# Patient Record
Sex: MALE | Race: BLACK OR AFRICAN AMERICAN | NOT HISPANIC OR LATINO | Employment: FULL TIME | URBAN - METROPOLITAN AREA
[De-identification: names, ages, dates, MRNs, and addresses within clinical notes are randomized per-mention and may not be internally consistent; named-entity substitution may affect disease eponyms.]

---

## 2023-12-18 ENCOUNTER — APPOINTMENT (OUTPATIENT)
Dept: RADIOLOGY | Facility: HOSPITAL | Age: 48
End: 2023-12-18
Payer: MEDICARE

## 2023-12-18 ENCOUNTER — HOSPITAL ENCOUNTER (EMERGENCY)
Facility: HOSPITAL | Age: 48
Discharge: HOME | End: 2023-12-18
Payer: MEDICARE

## 2023-12-18 VITALS
HEART RATE: 76 BPM | DIASTOLIC BLOOD PRESSURE: 89 MMHG | RESPIRATION RATE: 16 BRPM | HEIGHT: 68 IN | SYSTOLIC BLOOD PRESSURE: 134 MMHG | OXYGEN SATURATION: 98 % | WEIGHT: 175 LBS | BODY MASS INDEX: 26.52 KG/M2 | TEMPERATURE: 98.4 F
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
## 2023-12-18 DIAGNOSIS — S39.012A LOW BACK STRAIN, INITIAL ENCOUNTER: ICD-10-CM

## 2023-12-18 DIAGNOSIS — S01.01XA SCALP LACERATION, INITIAL ENCOUNTER: ICD-10-CM

## 2023-12-18 PROCEDURE — 2500000005 HC RX 250 GENERAL PHARMACY W/O HCPCS

## 2023-12-18 PROCEDURE — 96372 THER/PROPH/DIAG INJ SC/IM: CPT

## 2023-12-18 PROCEDURE — 70450 CT HEAD/BRAIN W/O DYE: CPT

## 2023-12-18 PROCEDURE — 99284 EMERGENCY DEPT VISIT MOD MDM: CPT

## 2023-12-18 PROCEDURE — 2500000001 HC RX 250 WO HCPCS SELF ADMINISTERED DRUGS (ALT 637 FOR MEDICARE OP)

## 2023-12-18 PROCEDURE — 99284 EMERGENCY DEPT VISIT MOD MDM: CPT | Mod: 25

## 2023-12-18 PROCEDURE — 70450 CT HEAD/BRAIN W/O DYE: CPT | Performed by: RADIOLOGY

## 2023-12-18 PROCEDURE — 2500000004 HC RX 250 GENERAL PHARMACY W/ HCPCS (ALT 636 FOR OP/ED)

## 2023-12-18 RX ORDER — ORPHENADRINE CITRATE 100 MG/1
100 TABLET, EXTENDED RELEASE ORAL 2 TIMES DAILY PRN
Qty: 6 TABLET | Refills: 0 | Status: SHIPPED | OUTPATIENT
Start: 2023-12-18 | End: 2023-12-21

## 2023-12-18 RX ORDER — ACETAMINOPHEN 325 MG/1
650 TABLET ORAL ONCE
Status: COMPLETED | OUTPATIENT
Start: 2023-12-18 | End: 2023-12-18

## 2023-12-18 RX ORDER — ORPHENADRINE CITRATE 30 MG/ML
60 INJECTION INTRAMUSCULAR; INTRAVENOUS ONCE
Status: COMPLETED | OUTPATIENT
Start: 2023-12-18 | End: 2023-12-18

## 2023-12-18 RX ORDER — LIDOCAINE 50 MG/G
1 PATCH TOPICAL DAILY
Qty: 7 PATCH | Refills: 0 | Status: SHIPPED | OUTPATIENT
Start: 2023-12-18 | End: 2023-12-25

## 2023-12-18 RX ORDER — KETOROLAC TROMETHAMINE 30 MG/ML
30 INJECTION, SOLUTION INTRAMUSCULAR; INTRAVENOUS ONCE
Status: COMPLETED | OUTPATIENT
Start: 2023-12-18 | End: 2023-12-18

## 2023-12-18 RX ORDER — BACITRACIN ZINC 500 UNIT/G
OINTMENT IN PACKET (EA) TOPICAL ONCE
Status: COMPLETED | OUTPATIENT
Start: 2023-12-18 | End: 2023-12-18

## 2023-12-18 RX ORDER — ACETAMINOPHEN 325 MG/1
650 TABLET ORAL EVERY 6 HOURS PRN
Qty: 60 TABLET | Refills: 0 | Status: SHIPPED | OUTPATIENT
Start: 2023-12-18 | End: 2024-01-01

## 2023-12-18 RX ORDER — NAPROXEN 500 MG/1
500 TABLET ORAL 2 TIMES DAILY PRN
Qty: 60 TABLET | Refills: 0 | Status: SHIPPED | OUTPATIENT
Start: 2023-12-18

## 2023-12-18 RX ORDER — LIDOCAINE 560 MG/1
1 PATCH PERCUTANEOUS; TOPICAL; TRANSDERMAL DAILY
Status: DISCONTINUED | OUTPATIENT
Start: 2023-12-18 | End: 2023-12-18 | Stop reason: HOSPADM

## 2023-12-18 RX ADMIN — ORPHENADRINE CITRATE 60 MG: 60 INJECTION INTRAMUSCULAR; INTRAVENOUS at 13:20

## 2023-12-18 RX ADMIN — BACITRACIN 1 APPLICATION: 500 OINTMENT TOPICAL at 14:36

## 2023-12-18 RX ADMIN — ACETAMINOPHEN 650 MG: 325 TABLET ORAL at 13:20

## 2023-12-18 RX ADMIN — LIDOCAINE 1 PATCH: 4 PATCH TOPICAL at 13:20

## 2023-12-18 RX ADMIN — KETOROLAC TROMETHAMINE 30 MG: 30 INJECTION, SOLUTION INTRAMUSCULAR; INTRAVENOUS at 15:17

## 2023-12-18 ASSESSMENT — PAIN - FUNCTIONAL ASSESSMENT: PAIN_FUNCTIONAL_ASSESSMENT: 0-10

## 2023-12-18 ASSESSMENT — LIFESTYLE VARIABLES
HAVE YOU EVER FELT YOU SHOULD CUT DOWN ON YOUR DRINKING: NO
HAVE PEOPLE ANNOYED YOU BY CRITICIZING YOUR DRINKING: NO
EVER FELT BAD OR GUILTY ABOUT YOUR DRINKING: NO
REASON UNABLE TO ASSESS: NO
EVER HAD A DRINK FIRST THING IN THE MORNING TO STEADY YOUR NERVES TO GET RID OF A HANGOVER: NO

## 2023-12-18 ASSESSMENT — COLUMBIA-SUICIDE SEVERITY RATING SCALE - C-SSRS
1. IN THE PAST MONTH, HAVE YOU WISHED YOU WERE DEAD OR WISHED YOU COULD GO TO SLEEP AND NOT WAKE UP?: NO
2. HAVE YOU ACTUALLY HAD ANY THOUGHTS OF KILLING YOURSELF?: NO
6. HAVE YOU EVER DONE ANYTHING, STARTED TO DO ANYTHING, OR PREPARED TO DO ANYTHING TO END YOUR LIFE?: NO

## 2023-12-18 ASSESSMENT — PAIN DESCRIPTION - PAIN TYPE: TYPE: ACUTE PAIN

## 2023-12-18 ASSESSMENT — PAIN DESCRIPTION - LOCATION: LOCATION: HEAD

## 2023-12-18 ASSESSMENT — PAIN SCALES - GENERAL: PAINLEVEL_OUTOF10: 6

## 2023-12-18 NOTE — Clinical Note
Erica Esposito was seen and treated in our emergency department on 12/18/2023.  He may return to work on 12/22/2023.       If you have any questions or concerns, please don't hesitate to call.      Esthela Vicente PA-C

## 2023-12-18 NOTE — ED PROVIDER NOTES
"  HPI    CC: Fall, Head trauma  HPI:   Erica Esposito is a 48 year old AA male presenting to ED with head injury and complaints of headache, dizziness and visual disturbances s/p fall. 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 He denies any LOC or amnesia to event and reports no pain, or nausea or emesis immediately afterwards. However, since the fall patient has been feeling more dizzy, worse on standing and endorses an acute progressively worsening generalized headache. He describes headache as 10/10 in severity and throbbing, \"pressure-like\" in nature. No associated vision loss, photophobia, phonophobia. Denies thunderclap component. He is not on any blood thinners.   Patient also complains of diffuse body aches and pain throughout the musculature in his entire back and neck. No midline vertebral tenderness. No extremity weakness/numbness, urinary or bowel incontinence, saddle anesthesia. No prior history of back injury or surgeries. He hasn't taken anything yet for the pain, states came straight to ED for evaluation.     PMHx:  PSH: reviewed per EMR  Allergies: Reviewed per EMR  Medications: Reviewed per EMR  FH: Noncontributory  Social Hx: Denies tobacco. Denies EtOH, illicit substance use.      ROS: All other systems were reviewed and negative.    Physical Exam:   GENERAL: Patient is well-appearing, cooperative. Vitals noted, NAD. Afebrile  HEAD: Normocephalic.   NECK: Supple, full ROM. No lymphadenopathy.  EYES: PERRL, EOMI, anicteric sclera. No erythema or exudates. Negative ramírez signs, no raccoon eyes. Visual acuity is 20/20 OS/OD/OU.  ENMT: Hearing grossly intact. TM's intact bilaterally, small amount of blood within left EAC's. No hemotympanum bilaterally. MMM, oronasopharynx is patent. Uvula midline. Normal phonation.  CV: Regular rate & rhythm. S1/S2 present. No murmur, gallops or rubs.  PULM: CTAB with " normal effort. No wheezes, rales or rhonchi.  ABDOMEN: Soft, nontender, nonsurgical. No peritoneal signs. BS+ x 4. No HSM or pulsatile masses.  MSK/Back: Full wt. bearing, Spontaneously WAYNE x 4. No midline vertebral tenderness over the C-spine, T-spine or L-spine. (+) minimal tenderness throughout paraspinal musculature. Sciatic notch is non-tender. SLR is negative bilaterally. Symmetric muscle bulk without gross step-off or deformities. Muscle strength is 5/5 in all four extremities. No joint pain or effusions.   EXTREMITIES: WWP, 2+ bilateral RPs and DPPs. No pitting LE edema.  SKIN: (+) Small superficial laceration to right parietal head with scant dried blood. No rash, lesions or discoloration. Cap refill <2s.  NEURO: AA&Ox3, Speech fluent. No focal deficits identified. Normal gait. Answering questions appropriately.    -------------------------------------------------------------------------------------------  ED Course & MDM   Triage notes and vital signs were reviewed. History and physical exam were performed and the patient was staffed with the attending. I also reviewed recent and relevant outside records for thorough HPI.    Medical Decision Making  Mr. Esposito is a 48 year old male presenting after fall with complaints of headache, dizziness and diplopia as well as diffuse musculoskeletal pain. No LOC or blood thinners. Vital signs are WNL. Patient is overall well-appearing, awake, alert and oriented x 3. GCS is 15 on arrival and mentation is intact to conversation. He ambulates without assistive devices, gait is non-ataxic. On exam, he has a small laceration to right parietal scalp and mild diffuse tenderness throughout musculature of thoracic and lumbar region. No midline vertebral tenderness or red flag back pain signs or symptoms.   C-spine is cleared based on [employer] CT criteria. Based on clinical presentation, non-contrast CT head ordered to evaluate for acute intracranial bleed, skull fractures  given new onset headache s/p head strike injury. He was given norflex, tylenol and lidocaine patch for pain control.    On reevaluation, patient noted near complete resolution of headache after treatment with analgesics here in ED.  He is no longer feeling dizzy and states diplopia has completely resolved.  I informed patient that CT head was negative for any acute intracranial pathology or skull fractures. Scalp laceration was cleaned and irrigated thoroughly with sterile water. Patient UTD on tetanus vaccine. On examination, laceration is less than 0.5cm, well approximated and extremely superficial without any foreign bodies identified. Therefore no indication for staple repair at this time. Topical bacitracin ointment applied and wound care instructions were provided. Patient is feeling much better and ready to go home. He was medicated with IM toradol prior to being discharged home with prescriptions for lidocaine patch, ibuprofen, tylenol and flexeril. He was also provided with note for work and advised about ED return precautions.     Amount and/or Complexity of Data Reviewed  Radiology: ordered. Decision-making details documented in ED Course.    Risk  OTC drugs.  Prescription drug management.        ED Course as of 12/18/23 1506   Mon Dec 18, 2023   1320 Patient medicated with norflex, lidocaine and tylenol for symptomatic support.  [MV]   1423 Non-contrast CTH was negative for intracranial hemorrhage or displaced skull fracture. [MV]      ED Course User Index  [MV] Esthela Vicente PA-C         Diagnoses as of 12/18/23 1506   Fall, initial encounter   Scalp laceration, initial encounter   Low back strain, initial encounter        Clinical Impression: Fall and head injury    Disposition: Home-going  Counseled patient regarding lab results, radiology results and suspected diagnosis. Advised to follow-up with PCP on out-patient basis. The patient has demonstrated clinical improvement and was discharged home in  stable condition with prescriptions for lidoderm, tylenol, ibuprofen and norflex. ED return precautions were discussed and provided at time of discharge. Patient acknowledged their understanding and is amendable to the treatment plan. All discharge instructions explained to patient and all questions were answered.    Esthela Vicente PA-C  [facility]    Disclaimer: This note was dictated by speech recognition. Minor errors in transcription may be present. Please call if questions.  -------------------------------------------------------------------------------------------       Esthela Vicente PA-C  12/18/23 1506

## 2023-12-18 NOTE — ED TRIAGE NOTES
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

## 2024-07-01 ENCOUNTER — HOSPITAL ENCOUNTER (EMERGENCY)
Facility: HOSPITAL | Age: 49
Discharge: HOME | End: 2024-07-01
Payer: MEDICARE

## 2024-07-01 ENCOUNTER — CLINICAL SUPPORT (OUTPATIENT)
Dept: EMERGENCY MEDICINE | Facility: HOSPITAL | Age: 49
End: 2024-07-01
Payer: MEDICARE

## 2024-07-01 ENCOUNTER — APPOINTMENT (OUTPATIENT)
Dept: RADIOLOGY | Facility: HOSPITAL | Age: 49
End: 2024-07-01
Payer: MEDICARE

## 2024-07-01 VITALS
OXYGEN SATURATION: 98 % | WEIGHT: 160 LBS | HEART RATE: 79 BPM | SYSTOLIC BLOOD PRESSURE: 156 MMHG | BODY MASS INDEX: 25.11 KG/M2 | RESPIRATION RATE: 16 BRPM | TEMPERATURE: 98.2 F | DIASTOLIC BLOOD PRESSURE: 101 MMHG | HEIGHT: 67 IN

## 2024-07-01 DIAGNOSIS — R07.81 RIB PAIN: ICD-10-CM
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
## 2024-07-01 LAB
ATRIAL RATE: 66 BPM
P AXIS: 39 DEGREES
P OFFSET: 189 MS
P ONSET: 139 MS
PR INTERVAL: 144 MS
Q ONSET: 211 MS
QRS COUNT: 11 BEATS
QRS DURATION: 88 MS
QT INTERVAL: 372 MS
QTC CALCULATION(BAZETT): 389 MS
QTC FREDERICIA: 384 MS
R AXIS: 43 DEGREES
T AXIS: 50 DEGREES
T OFFSET: 397 MS
VENTRICULAR RATE: 66 BPM

## 2024-07-01 PROCEDURE — 2500000005 HC RX 250 GENERAL PHARMACY W/O HCPCS: Performed by: PHYSICIAN ASSISTANT

## 2024-07-01 PROCEDURE — 93010 ELECTROCARDIOGRAM REPORT: CPT

## 2024-07-01 PROCEDURE — 2500000002 HC RX 250 W HCPCS SELF ADMINISTERED DRUGS (ALT 637 FOR MEDICARE OP, ALT 636 FOR OP/ED): Performed by: PHYSICIAN ASSISTANT

## 2024-07-01 PROCEDURE — 71101 X-RAY EXAM UNILAT RIBS/CHEST: CPT | Mod: LT

## 2024-07-01 PROCEDURE — 71101 X-RAY EXAM UNILAT RIBS/CHEST: CPT | Mod: LEFT SIDE | Performed by: SURGERY

## 2024-07-01 PROCEDURE — 93005 ELECTROCARDIOGRAM TRACING: CPT

## 2024-07-01 PROCEDURE — 99284 EMERGENCY DEPT VISIT MOD MDM: CPT | Performed by: PHYSICIAN ASSISTANT

## 2024-07-01 PROCEDURE — 99283 EMERGENCY DEPT VISIT LOW MDM: CPT

## 2024-07-01 RX ORDER — TIZANIDINE 2 MG/1
2 TABLET ORAL EVERY 6 HOURS PRN
Qty: 30 TABLET | Refills: 0 | Status: SHIPPED | OUTPATIENT
Start: 2024-07-01 | End: 2024-07-01

## 2024-07-01 RX ORDER — ACETAMINOPHEN 325 MG/1
975 TABLET ORAL ONCE
Status: COMPLETED | OUTPATIENT
Start: 2024-07-01 | End: 2024-07-01

## 2024-07-01 RX ORDER — IBUPROFEN 600 MG/1
600 TABLET ORAL EVERY 6 HOURS PRN
Qty: 30 TABLET | Refills: 0 | Status: SHIPPED | OUTPATIENT
Start: 2024-07-01 | End: 2024-07-01

## 2024-07-01 RX ORDER — ORPHENADRINE CITRATE 100 MG/1
100 TABLET, EXTENDED RELEASE ORAL ONCE
Status: COMPLETED | OUTPATIENT
Start: 2024-07-01 | End: 2024-07-01

## 2024-07-01 RX ORDER — IBUPROFEN 600 MG/1
600 TABLET ORAL EVERY 6 HOURS PRN
Qty: 30 TABLET | Refills: 0 | Status: SHIPPED | OUTPATIENT
Start: 2024-07-01

## 2024-07-01 RX ORDER — LIDOCAINE 50 MG/G
1 PATCH TOPICAL DAILY
Qty: 14 PATCH | Refills: 0 | Status: SHIPPED | OUTPATIENT
Start: 2024-07-01 | End: 2024-07-01

## 2024-07-01 RX ORDER — LIDOCAINE 50 MG/G
1 PATCH TOPICAL DAILY
Qty: 14 PATCH | Refills: 0 | Status: SHIPPED | OUTPATIENT
Start: 2024-07-01

## 2024-07-01 RX ORDER — TIZANIDINE 2 MG/1
2 TABLET ORAL EVERY 6 HOURS PRN
Qty: 30 TABLET | Refills: 0 | Status: SHIPPED | OUTPATIENT
Start: 2024-07-01 | End: 2024-07-11

## 2024-07-01 RX ORDER — LIDOCAINE 560 MG/1
1 PATCH PERCUTANEOUS; TOPICAL; TRANSDERMAL ONCE
Status: DISCONTINUED | OUTPATIENT
Start: 2024-07-01 | End: 2024-07-02 | Stop reason: HOSPADM

## 2024-07-01 ASSESSMENT — PAIN DESCRIPTION - LOCATION: LOCATION: BACK

## 2024-07-01 ASSESSMENT — LIFESTYLE VARIABLES
TOTAL SCORE: 0
HAVE YOU EVER FELT YOU SHOULD CUT DOWN ON YOUR DRINKING: NO
EVER HAD A DRINK FIRST THING IN THE MORNING TO STEADY YOUR NERVES TO GET RID OF A HANGOVER: NO
EVER FELT BAD OR GUILTY ABOUT YOUR DRINKING: NO
HAVE PEOPLE ANNOYED YOU BY CRITICIZING YOUR DRINKING: NO

## 2024-07-01 ASSESSMENT — PAIN DESCRIPTION - PAIN TYPE: TYPE: ACUTE PAIN

## 2024-07-01 ASSESSMENT — COLUMBIA-SUICIDE SEVERITY RATING SCALE - C-SSRS
2. HAVE YOU ACTUALLY HAD ANY THOUGHTS OF KILLING YOURSELF?: NO
1. IN THE PAST MONTH, HAVE YOU WISHED YOU WERE DEAD OR WISHED YOU COULD GO TO SLEEP AND NOT WAKE UP?: NO
6. HAVE YOU EVER DONE ANYTHING, STARTED TO DO ANYTHING, OR PREPARED TO DO ANYTHING TO END YOUR LIFE?: NO

## 2024-07-01 ASSESSMENT — PAIN - FUNCTIONAL ASSESSMENT: PAIN_FUNCTIONAL_ASSESSMENT: 0-10

## 2024-07-01 ASSESSMENT — PAIN DESCRIPTION - PROGRESSION: CLINICAL_PROGRESSION: NOT CHANGED

## 2024-07-01 ASSESSMENT — PAIN SCALES - GENERAL: PAINLEVEL_OUTOF10: 10 - WORST POSSIBLE PAIN
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
## 2024-07-02 NOTE — DISCHARGE INSTRUCTIONS
Your x-ray did not show any broken bones or injury to the heart or lungs.  Please take the prescribed medications as needed.  Follow-up with your primary care provider.

## 2024-07-02 NOTE — PROGRESS NOTES
Emergency Medicine Transition of Care Note.    I received Erica Esposito in signout from Dora Goodwin PA-C.  Please see the previous ED provider note for all HPI, PE and MDM up to the time of signout at 7/1/2024 2200. This is in addition to the primary record.    In brief Erica Esposito is an 48 y.o. male presenting for right neck, left low back and left-sided rib cage pain.  At the time of signout we were awaiting: X-ray of the left ribs and chest x-ray.    X-ray with no evidence of acute rib fracture or cardiopulmonary pathologies.  The patient was discharged in stable condition with a prescription for NSAIDs and muscle relaxants.       Medical Decision Making      Final diagnoses:   None           Procedure  Procedures    Vic Almeida PA-C